# Patient Record
Sex: FEMALE
[De-identification: names, ages, dates, MRNs, and addresses within clinical notes are randomized per-mention and may not be internally consistent; named-entity substitution may affect disease eponyms.]

---

## 2019-01-07 ENCOUNTER — HOSPITAL ENCOUNTER (EMERGENCY)
Dept: HOSPITAL 14 - H.ER | Age: 19
Discharge: HOME | End: 2019-01-07
Payer: COMMERCIAL

## 2019-01-07 VITALS
RESPIRATION RATE: 18 BRPM | HEART RATE: 78 BPM | OXYGEN SATURATION: 99 % | TEMPERATURE: 98.2 F | DIASTOLIC BLOOD PRESSURE: 76 MMHG | SYSTOLIC BLOOD PRESSURE: 151 MMHG

## 2019-01-07 DIAGNOSIS — N64.4: Primary | ICD-10-CM

## 2019-01-07 NOTE — ED PDOC
HPI: CCC, URI, Sore Throat


Time Seen by Provider: 01/07/19 21:12


Chief Complaint (Nursing): Breast Problem


History Per: Patient, 


Onset/Duration Of Symptoms: Other (years)


Location Of Pain: Other (left breast)


Associated Symptoms: denies: Fever, Chills


Severity: Mild


Additional Complaint(s): 





Pt. reports 1+ year history of intermittent bilateral breast pain L>R.  She 

reports over past 1 month left breast has been hurting almost every day, mostly 

at night.  She also noted some secretion (clear) from left nipple.  Mom reports 

pt. had been [place] by a doctor last [place] for same symptoms and had

a "test" which showed a cyst in her breast.  She denies any personal or family 

history of breast, uterine or ovarian CA.





Past Medical History


Reviewed: Historical Data, Nursing Documentation, Vital Signs


Vital Signs: 





                                Last Vital Signs











Temp  98.2 F   01/07/19 18:27


 


Pulse  78   01/07/19 18:27


 


Resp  18   01/07/19 18:27


 


BP  151/76 H  01/07/19 18:27


 


Pulse Ox  99   01/07/19 18:27














- Medical History


PMH: No Chronic Diseases





- Family History


Family History: States: No Known Family Hx





- Home Medications


Home Medications: 


                                Ambulatory Orders











 Medication  Instructions  Recorded


 


Ibuprofen [Motrin Tab] 600 mg PO TID #15 tab 01/07/19














- Allergies


Allergies/Adverse Reactions: 


                                    Allergies











Allergy/AdvReac Type Severity Reaction Status Date / Time


 


No Known Allergies Allergy   Verified 01/07/19 18:26














Review of Systems


Constitutional: Negative for: Fever, Weakness


Cardiovascular: Negative for: Chest Pain, Palpitations


Gastrointestinal: Negative for: Nausea, Vomiting, Abdominal Pain


Genitourinary Female: Negative for: Pelvic Pain


Skin: Negative for: Rash, Lesions





Physical Exam





- Reviewed


Vital Signs Reviewed: Yes





- Physical Exam


Appears: Positive for: Well, No Acute Distress


Skin: Positive for: Normal Color, Warm, Dry.  Negative for: Rash


Eye Exam: Positive for: Normal appearance


Neck: Positive for: Normal


Cardiovascular/Chest: Positive for: Regular Rate, Rhythm, Other (BREAST EXAM 

bilaterally: no mass, no localized tenderness, no swelling, no skin changes, no 

nipple dischare.  no axillary lymphadenopathy.)


Respiratory: Positive for: Normal Breath Sounds


Lymphatic: Negative for: Adenopathy (axillary)


Neurologic/Psych: Positive for: Alert





- ECG


O2 Sat by Pulse Oximetry: 99





Medical Decision Making


Medical Decision Making: 





Exam as above, no infectious process with normal exam in ED. Stressed importance

of close f/u as further work up ie imaging may be needed.  Pt. and Mom agreeable

to plan.





Disposition





- Clinical Impression


Clinical Impression: 


 Pain of breast








- Patient ED Disposition


Is Patient to be Admitted: No


Counseled Patient/Family Regarding: Need For Followup





- Disposition


Referrals: 


[facility] [Outside]


Disposition: Routine/Home


Disposition Time: 22:29


Condition: STABLE


Prescriptions: 


Ibuprofen [Motrin Tab] 600 mg PO TID #15 tab


Instructions:  Mastalgia (DC), Common Breast Problems


Print Language: [place]ian